# Patient Record
Sex: MALE | Race: WHITE | Employment: FULL TIME | URBAN - METROPOLITAN AREA
[De-identification: names, ages, dates, MRNs, and addresses within clinical notes are randomized per-mention and may not be internally consistent; named-entity substitution may affect disease eponyms.]

---

## 2022-06-29 ENCOUNTER — APPOINTMENT (OUTPATIENT)
Dept: GENERAL RADIOLOGY | Age: 47
End: 2022-06-29
Payer: COMMERCIAL

## 2022-06-29 ENCOUNTER — HOSPITAL ENCOUNTER (EMERGENCY)
Age: 47
Discharge: HOME | End: 2022-06-29
Attending: EMERGENCY MEDICINE
Payer: COMMERCIAL

## 2022-06-29 VITALS
WEIGHT: 200.2 LBS | RESPIRATION RATE: 16 BRPM | DIASTOLIC BLOOD PRESSURE: 84 MMHG | OXYGEN SATURATION: 97 % | HEART RATE: 94 BPM | TEMPERATURE: 98.8 F | BODY MASS INDEX: 32.31 KG/M2 | SYSTOLIC BLOOD PRESSURE: 142 MMHG

## 2022-06-29 DIAGNOSIS — S82.891A CLOSED FRACTURE OF RIGHT ANKLE, INITIAL ENCOUNTER: Primary | ICD-10-CM

## 2022-06-29 PROCEDURE — 73630 X-RAY EXAM OF FOOT: CPT

## 2022-06-29 PROCEDURE — 73610 X-RAY EXAM OF ANKLE: CPT

## 2022-06-29 PROCEDURE — 29515 APPLICATION SHORT LEG SPLINT: CPT

## 2022-06-29 PROCEDURE — 99283 EMERGENCY DEPT VISIT LOW MDM: CPT

## 2022-06-29 ASSESSMENT — PAIN DESCRIPTION - LOCATION
LOCATION: FOOT
LOCATION: FOOT

## 2022-06-29 ASSESSMENT — PAIN DESCRIPTION - ORIENTATION
ORIENTATION: RIGHT
ORIENTATION: RIGHT

## 2022-06-29 ASSESSMENT — PAIN DESCRIPTION - FREQUENCY: FREQUENCY: CONTINUOUS

## 2022-06-29 ASSESSMENT — PAIN SCALES - GENERAL
PAINLEVEL_OUTOF10: 3
PAINLEVEL_OUTOF10: 4

## 2022-06-29 ASSESSMENT — PAIN - FUNCTIONAL ASSESSMENT
PAIN_FUNCTIONAL_ASSESSMENT: 0-10
PAIN_FUNCTIONAL_ASSESSMENT: 0-10
PAIN_FUNCTIONAL_ASSESSMENT: PREVENTS OR INTERFERES SOME ACTIVE ACTIVITIES AND ADLS

## 2022-06-29 ASSESSMENT — PAIN DESCRIPTION - DESCRIPTORS: DESCRIPTORS: SORE

## 2022-06-29 NOTE — Clinical Note
Alea Hughes was seen and treated in our emergency department on 6/29/2022. He may return to work on 07/05/2022. Off work until seen by orthopedic physician and release. Elevate is much as possible to help with pain and swelling. Apply ice to the injured area every 2-3 hours for the next 2 days to help with pain and swelling. Tylenol or ibuprofen as needed for pain. If you have any questions or concerns, please don't hesitate to call.       Renzo Harris MD

## 2022-06-30 NOTE — ED TRIAGE NOTES
Pt to ED with complaint of right foot injury today. Pt states he was stepping off a trailer about 24 inches high today and rolled his foot. C/o pain outer distal foot. Ecchymosis, edema noted to outer distal foot. Right foot warm to touch with brisk cap refill, + dorsalis pedal pulse. Pt states he has been elevating and using ice to foot at home.

## 2022-06-30 NOTE — ED PROVIDER NOTES
[facility]  eMERGENCY dEPARTMENT eNCOUnter      Pt Name: Garry Miller  MRN: [id number]  [place] 1975  Date of evaluation: 6/29/2022  Provider: Jeremy Booth MD    [address] [place] [address]       Chief Complaint   Patient presents with    Foot Injury     States rolled right foot while getting off a trailer around 3pm.          HISTORY OF PRESENT ILLNESS  (Location/Symptom, Timing/Onset, Context/Setting, Quality, Duration, Modifying Factors, Severity.)   Garry Miller is a 41 y.o. male who presents to the emergency department complaining of right ankle and foot pain. Injury occurred around 3 PM today while at work. He was stepping off of his trailer. He was about 24 inches up. He stepped onto a rock. His foot and ankle rolled. He noticed some swelling over his lateral ankle and foot. He noticed some bruising. Pain is worse when he walks. No knee pain. Nursing Notes were reviewed and I agree. REVIEW OF SYSTEMS    (2-9 systems for level 4, 10 or more for level 5)     Musculoskeletal: Right ankle and foot pain. Swelling in his right ankle and foot. Skin: Bruising to his right ankle and foot. No lacerations or abrasions. Neuro: No extremity weakness numbness or tingling. Except as noted above the remainder of the review of systems was reviewed and negative. PAST MEDICAL HISTORY         Diagnosis Date    Hypertension     Kidney stone        SURGICAL HISTORY     History reviewed. No pertinent surgical history. CURRENT MEDICATIONS       Previous Medications    LISINOPRIL (PRINIVIL;ZESTRIL) 20 MG TABLET    Take 10 mg by mouth daily        ALLERGIES     Patient has no known allergies. FAMILY HISTORY     History reviewed. No pertinent family history. No family status information on file. SOCIAL HISTORY      reports that he has been smoking cigarettes. He has been smoking about 1.00 pack per day.  He has never used smokeless tobacco. He reports that he does not drink alcohol and does not use drugs. PHYSICAL EXAM    (up to 7 for level 4, 8 or more for level 5)     ED Triage Vitals [06/29/22 2112]   BP Temp Temp Source Heart Rate Resp SpO2 Height Weight   (!) 142/84 98.8 °F (37.1 °C) Oral 94 16 97 % -- 200 lb 3.2 oz (90.8 kg)       General: Alert white male no acute distress. Musculoskeletal: Right knee is nontender without swelling. Intact range of motion without pain. Proximal mid tib-fib are nontender. There is some tenderness over the distal fibula. There is some mild swelling over the anterior lateral ankle. The medial ankle is nontender. The posterior ankle and Achilles tendon are nontender. The Achilles tendon is intact. Range of motion is intact with mild to moderate discomfort. The swelling extends onto the dorsal lateral midfoot with tenderness in the dorsal lateral midfoot. The hindfoot and distal foot are nontender. Intact distal pulses. Skin: Bruising over the anterior lateral ankle extending onto the dorsal lateral midfoot. No lacerations or abrasions. Neuro: Intact motor function sensation right lower extremity. DIAGNOSTIC RESULTS     RADIOLOGY:   Non-plain film images such as CT, Ultrasound and MRI are read by the radiologist. Plain radiographic images are visualized and preliminarily interpreted by David Michel MD with the below findings:      Interpretation per the Radiologist below, if available at the time of this note:    XR FOOT RIGHT (MIN 3 VIEWS)   Final Result   Nondisplaced lateral and medial malleolar fractures         XR ANKLE RIGHT (MIN 3 VIEWS)   Final Result   Nondisplaced lateral and medial malleolar fractures             LABS:  Labs Reviewed - No data to display    All other labs were within normal range or not returned as of this dictation.     EMERGENCY DEPARTMENT COURSE and DIFFERENTIAL DIAGNOSIS/MDM:   Vitals:    Vitals:    06/29/22 2112   BP: (!) 142/84   Pulse: 94   Resp: 16   Temp: 98.8 °F (37.1 °C)   TempSrc: Oral   SpO2: 97%   Weight: 200 lb 3.2 oz (90.8 kg)       This patient injured his ankle as above. He has nondisplaced medial lateral malleolar fractures on x-rays of his ankle and foot. He was placed in an Ortho-Glass posterior splint. He was placed on crutches, nonweightbearing. He wants to take Tylenol and ibuprofen for pain. He was given orthopedic referral for follow-up for definitive treatment. X-ray results, diagnosis, treatment plan were discussed with the patient and his wife. They understand the treatment plan follow-up as discussed. PROCEDURES:  Splint Application    Date/Time: 6/29/2022 10:18 PM  Performed by: Wade Valencia MD  Authorized by: Wade Valencia MD     Consent:     Consent obtained:  Verbal    Consent given by:  Patient    Risks discussed:  Discoloration, numbness, pain and swelling  Pre-procedure details:     Sensation:  Normal    Skin color:  Bruising  Procedure details:     Laterality:  Right    Location:  Ankle    Ankle:  R ankle    Splint type:  Short leg    Supplies:  Elastic bandage, Ortho-Glass and cotton padding  Post-procedure details:     Pain:  Improved    Sensation:  Normal    Skin color:  Unchanged    Patient tolerance of procedure: Tolerated well, no immediate complications          FINAL IMPRESSION      1.  Closed fracture of right ankle, initial encounter          DISPOSITION/PLAN   DISPOSITION Decision To Discharge 06/29/2022 10:15:22 PM      PATIENT REFERRED TO:  Michelle Burciaga MD  [place] [address]  [phone]    Schedule an appointment as soon as possible for a visit in 3 days        DISCHARGE MEDICATIONS:  New Prescriptions    No medications on file       (Please note that portions of this note were completed with a voice recognition program.  Efforts were made to edit the dictations but occasionally words are mis-transcribed.)    David Michel MD  Attending Emergency Physician        Maria Del Carmen Dockery MD  06/29/22 [id number]

## 2022-06-30 NOTE — ED NOTES
Right foot warm to touch, orthoglass splint in place. Pt given discharge instructions, verbalizes understanding to elevate right leg as much as possible, leave orthoglass splint in place and keep dry, use crutches for ambulating.       Mery Powers RN  06/29/22 2926

## 2022-07-01 ENCOUNTER — OFFICE VISIT (OUTPATIENT)
Dept: ORTHOPEDIC SURGERY | Age: 47
End: 2022-07-01
Payer: COMMERCIAL

## 2022-07-01 VITALS — HEIGHT: 69 IN | WEIGHT: 200 LBS | BODY MASS INDEX: 29.62 KG/M2

## 2022-07-01 DIAGNOSIS — F17.200 CURRENT SMOKER: ICD-10-CM

## 2022-07-01 DIAGNOSIS — S82.841A CLOSED BIMALLEOLAR FRACTURE OF RIGHT ANKLE, INITIAL ENCOUNTER: Primary | ICD-10-CM

## 2022-07-01 PROCEDURE — 99406 BEHAV CHNG SMOKING 3-10 MIN: CPT | Performed by: ORTHOPAEDIC SURGERY

## 2022-07-01 PROCEDURE — 99203 OFFICE O/P NEW LOW 30 MIN: CPT | Performed by: ORTHOPAEDIC SURGERY

## 2022-07-01 PROCEDURE — L4361 PNEUMA/VAC WALK BOOT PRE OTS: HCPCS | Performed by: ORTHOPAEDIC SURGERY

## 2022-07-01 PROCEDURE — 27808 TREATMENT OF ANKLE FRACTURE: CPT | Performed by: ORTHOPAEDIC SURGERY

## 2022-07-04 PROBLEM — S82.841A CLOSED BIMALLEOLAR FRACTURE OF RIGHT ANKLE: Status: ACTIVE | Noted: 2022-07-04

## 2022-07-04 PROBLEM — F17.200 CURRENT SMOKER: Status: ACTIVE | Noted: 2022-07-04

## 2022-07-04 NOTE — PROGRESS NOTES
CHIEF COMPLAINT: Right ankle pain / medial & lateral malleolus avulsion fracture. 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 Danika Ortiz 41 y.o.  male presents today for the first visit for evaluation of a right ankle injury which occurred when he was stepping off a trailer at work and rolled his right ankle. He was first seen and evaluated in [facility] , where he was x-rayed, splinted and asked to f/u with Orthopedics. He is complaining of medial & lateral ankle pain and swelling. This is better with elevation and worse with bearing any wt. The pain is sharp and not radiating. No numbness or tingling sensation. Alleviating factors: rest. No other complaint. Past Medical History:   Diagnosis Date    Hypertension     Kidney stone        No past surgical history on file. Social History     Socioeconomic History    Marital status:      Spouse name: Not on file    Number of children: Not on file    Years of education: Not on file    Highest education level: Not on file   Occupational History    Not on file   Tobacco Use    Smoking status: Current Every Day Smoker     Packs/day: 1.00     Types: Cigarettes    Smokeless tobacco: Never Used   Substance and Sexual Activity    Alcohol use: No    Drug use: No    Sexual activity: Not on file   Other Topics Concern    Not on file   Social History Narrative    Not on file     Social Determinants of Health     Financial Resource Strain:     Difficulty of Paying Living Expenses: Not on file   Food Insecurity:     Worried About Running Out of Food in the Last Year: Not on file    [place] of Food in the Last Year: Not on file   Transportation Needs:     Lack of Transportation (Medical): Not on file    Lack of Transportation (Non-Medical):  Not on file   Physical Activity:     Days of Exercise per Week: Not on file    Minutes of Exercise per Session: Not on file   Stress:     Feeling of Stress : Not on file Social Connections:     Frequency of Communication with Friends and Family: Not on file    Frequency of Social Gatherings with Friends and Family: Not on file    Attends Jehovah's witness Services: Not on file    Active Member of Clubs or Organizations: Not on file    Attends Club or Organization Meetings: Not on file    Marital Status: Not on file   Intimate Partner Violence:     Fear of Current or Ex-Partner: Not on file    Emotionally Abused: Not on file    Physically Abused: Not on file    Sexually Abused: Not on file   Housing Stability:     Unable to Pay for Housing in the Last Year: Not on file    Number of [place] in the Last Year: Not on file    Unstable Housing in the Last Year: Not on file       No family history on file. Current Outpatient Medications on File Prior to Visit   Medication Sig Dispense Refill    lisinopril (PRINIVIL;ZESTRIL) 20 MG tablet Take 10 mg by mouth daily        No current facility-administered medications on file prior to visit. Pertinent items are noted in HPI  Review of systems reviewed from Patient History Form and available in the patient's chart under the Media tab. PHYSICAL EXAMINATION:  Mr. Mayra Zamorano is a very pleasant 41 y.o.  male who presents today in no acute distress, awake, alert, and oriented. He is well dressed, nourished and  groomed. Patient with normal affect. Height is  5' 9\" (1.753 m), weight is 200 lb (90.7 kg), Body mass index is 29.53 kg/m². Resting respiratory rate is 16. Examination of the gait, showed that the patient walks with a crutch, NWB right leg and in a splint . Examination of both ankles showing a decreased range of motion of the right ankle compare to the other side. There is moderate swelling that can be seen, as well as ecchymosis. He has intact sensation and good pedal pulses. He has significant tenderness on deep palpation over the medial & lateral malleolus of the right ankle. IMAGING: Xrays, 3 views of the right ankle dated 6/29/2022 from [facility],  were reviewed, and showed a minimally displaced medial & lateral malleolus avulsion fracture. IMPRESSION: Right ankle minimally displaced medial & lateral malleolus avulsion fracture. PLAN:  I discussed that the overall alignment of this fracture is good and that we can try to treat this non-operatively in a boot WBAT. We discussed the risk of nonunion and or malunion. We applied a boot today in the office and instructed him  in care. We will see him  back in 6 weeks at which time we will get a new xray of the right ankle. Light duty for 6 weeks. The patient smokes cigarettes, and we discussed with the patient the risks of smoking on general health and also on bone and soft tissue healing (delay and non-union), and promised to cut down or stop smoking. Smoking: Educated the patient regarding the hazards of smoking and that it harms their body in many ways. It increases the chance of developing heart disease, lung disease, cancer, and other health problems including poor bone and wound healing. The importance of smoking cessation for optimal bone and wound healing was stressed. This was communicated verbally, 5 Minutes. Procedures    CLOSED TX BIMALLEOLAR ANKLE FRACTURE W/O MANIP    Breg Tall Lakisha Walking Boot     Patient was prescribed a Breg Tall Lakisha Walking Boot. The right ankle will require stabilization / immobilization from this semi-rigid / rigid orthosis to improve their function. The orthosis will assist in protecting the affected area, provide functional support and facilitate healing. Patient was instructed to progress ambulation weight bearing as tolerated in the device. The patient was educated and fit by a healthcare professional with expert knowledge and specialization in brace application while under the direct supervision of the physician.   Verbal and written instructions for the use of and application of this item were provided. They were instructed to contact the office immediately should the brace result in increased pain, decreased sensation, increased swelling or worsening of the condition.      Georgiana Blandon MD

## 2022-08-12 ENCOUNTER — OFFICE VISIT (OUTPATIENT)
Dept: ORTHOPEDIC SURGERY | Age: 47
End: 2022-08-12
Payer: COMMERCIAL

## 2022-08-12 VITALS — HEIGHT: 69 IN | WEIGHT: 200 LBS | BODY MASS INDEX: 29.62 KG/M2

## 2022-08-12 DIAGNOSIS — F17.200 CURRENT SMOKER: ICD-10-CM

## 2022-08-12 DIAGNOSIS — S82.841D CLOSED BIMALLEOLAR FRACTURE OF RIGHT ANKLE WITH ROUTINE HEALING, SUBSEQUENT ENCOUNTER: Primary | ICD-10-CM

## 2022-08-12 PROCEDURE — 99024 POSTOP FOLLOW-UP VISIT: CPT | Performed by: ORTHOPAEDIC SURGERY

## 2022-08-12 PROCEDURE — 99406 BEHAV CHNG SMOKING 3-10 MIN: CPT | Performed by: ORTHOPAEDIC SURGERY

## 2022-08-12 NOTE — LETTER
[facility]  [address] [address]  Phone: [phone]  Fax: [phone]    Latha Barnhart MD        August 12, 2022     Patient: Celsa Barnes   YOB: 1975   Date of Visit: 8/12/2022       To Whom It May Concern: It is my medical opinion that Celsa Barnes may return to work on Monday 08/15/22 full duty. If you have any questions or concerns, please don't hesitate to call.     Sincerely,        Latha Barnhart MD

## 2022-08-21 NOTE — PROGRESS NOTES
CHIEF COMPLAINT: Right ankle pain / medial & lateral malleolus avulsion fracture. 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 Fermin Carmona 41 y.o.  male presents today for f/u evaluation of a right ankle injury which occurred when he was stepping off a trailer at work and rolled his right ankle. He was first seen and evaluated in [place] , where he was x-rayed, splinted and asked to f/u with Orthopedics. He is complaining of medial & lateral ankle pain and swelling 2/10. This is better with elevation and worse with bearing any wt. The pain is sharp and not radiating. No numbness or tingling sensation. Alleviating factors: rest. No other complaint. Past Medical History:   Diagnosis Date    Hypertension     Kidney stone        No past surgical history on file. Social History     Socioeconomic History    Marital status:      Spouse name: Not on file    Number of children: Not on file    Years of education: Not on file    Highest education level: Not on file   Occupational History    Not on file   Tobacco Use    Smoking status: Every Day     Packs/day: 1.00     Types: Cigarettes    Smokeless tobacco: Never   Substance and Sexual Activity    Alcohol use: No    Drug use: No    Sexual activity: Not on file   Other Topics Concern    Not on file   Social History Narrative    Not on file     Social Determinants of Health     Financial Resource Strain: Not on file   Food Insecurity: Not on file   Transportation Needs: Not on file   Physical Activity: Not on file   Stress: Not on file   Social Connections: Not on file   Intimate Partner Violence: Not on file   Housing Stability: Not on file       No family history on file. Current Outpatient Medications on File Prior to Visit   Medication Sig Dispense Refill    lisinopril (PRINIVIL;ZESTRIL) 20 MG tablet Take 10 mg by mouth daily        No current facility-administered medications on file prior to visit.        Pertinent items are noted in HPI  Review of systems reviewed from Patient History Form and available in the patient's chart under the Media tab. PHYSICAL EXAMINATION:  Mr. Anum Hunter is a very pleasant 41 y.o.  male who presents today in no acute distress, awake, alert, and oriented. He is well dressed, nourished and  groomed. Patient with normal affect. Height is  5' 9\" (1.753 m), weight is 200 lb (90.7 kg), Body mass index is 29.53 kg/m². Resting respiratory rate is 16. Examination of the gait, showed that the patient walks WB right leg and in a boot. Examination of both ankles showing a decreased range of motion of the right ankle compare to the other side. There is moderate swelling that can be seen, as well as ecchymosis. He has intact sensation and good pedal pulses. He has minimal tenderness on deep palpation over the medial & lateral malleolus of the right ankle. IMAGING: Xrays, 3 views of the right ankle taken today in the office, were reviewed, and showed a minimally displaced medial & lateral malleolus avulsion fracture. IMPRESSION: Right ankle minimally displaced medial & lateral malleolus avulsion fracture. PLAN:  I discussed that the overall alignment of this fracture is still good. He will be WBAT in the boot, and start aggressive ROM and peroneal strengthening exercise. Off the boot in 1-2 weeks. The patient will come back for a follow up in 2 months. At that time, we will take 3 views of the right ankle standing. Full duty on Monday 8/15/2022. The patient smokes cigarettes, and we discussed with the patient the risks of smoking on general health and also on bone and soft tissue healing (delay and non-union), and promised to cut down or stop smoking. Smoking: Educated the patient regarding the hazards of smoking and that it harms their body in many ways.  It increases the chance of developing heart disease, lung disease, cancer, and other health problems including poor bone and wound healing. The importance of smoking cessation for optimal bone and wound healing was stressed. This was communicated verbally, 5 Minutes.       Dinora Mcknight MD

## 2022-10-12 ENCOUNTER — OFFICE VISIT (OUTPATIENT)
Dept: ORTHOPEDIC SURGERY | Age: 47
End: 2022-10-12
Payer: COMMERCIAL

## 2022-10-12 VITALS — BODY MASS INDEX: 29.62 KG/M2 | HEIGHT: 69 IN | WEIGHT: 200 LBS

## 2022-10-12 DIAGNOSIS — S82.841D CLOSED BIMALLEOLAR FRACTURE OF RIGHT ANKLE WITH ROUTINE HEALING, SUBSEQUENT ENCOUNTER: Primary | ICD-10-CM

## 2022-10-12 DIAGNOSIS — F17.200 CURRENT SMOKER: ICD-10-CM

## 2022-10-12 PROCEDURE — 99406 BEHAV CHNG SMOKING 3-10 MIN: CPT | Performed by: ORTHOPAEDIC SURGERY

## 2022-10-12 PROCEDURE — 99213 OFFICE O/P EST LOW 20 MIN: CPT | Performed by: ORTHOPAEDIC SURGERY

## 2022-10-12 NOTE — PROGRESS NOTES
CHIEF COMPLAINT: Right ankle pain / medial & lateral malleolus avulsion fracture. 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 Maryann Moss 41 y.o.  male presents today for f/u evaluation of a right ankle injury which occurred when he was stepping off a trailer at work and rolled his right ankle. He was first seen and evaluated in [place] , where he was x-rayed, splinted and asked to f/u with Orthopedics. He reports mild achy ankle pain and swelling 4/10 worse in the morning and when the weather is changing to cold. Most of the time has no pain. Pain is not radiating. No numbness or tingling sensation. No other complaint. He works in [employer] and has been back to work full duty. He is a smoker. Past Medical History:   Diagnosis Date    Hypertension     Kidney stone        No past surgical history on file. Social History     Socioeconomic History    Marital status:      Spouse name: Not on file    Number of children: Not on file    Years of education: Not on file    Highest education level: Not on file   Occupational History    Not on file   Tobacco Use    Smoking status: Every Day     Packs/day: 1.00     Types: Cigarettes    Smokeless tobacco: Never   Substance and Sexual Activity    Alcohol use: No    Drug use: No    Sexual activity: Not on file   Other Topics Concern    Not on file   Social History Narrative    Not on file     Social Determinants of Health     Financial Resource Strain: Not on file   Food Insecurity: Not on file   Transportation Needs: Not on file   Physical Activity: Not on file   Stress: Not on file   Social Connections: Not on file   Intimate Partner Violence: Not on file   Housing Stability: Not on file       No family history on file.     Current Outpatient Medications on File Prior to Visit   Medication Sig Dispense Refill    lisinopril (PRINIVIL;ZESTRIL) 20 MG tablet Take 10 mg by mouth daily        No current facility-administered medications on file prior to visit. Pertinent items are noted in HPI  Review of systems reviewed from Patient History Form and available in the patient's chart under the Media tab. PHYSICAL EXAMINATION:  Mr. Isidoro Miller is a very pleasant 41 y.o.  male who presents today in no acute distress, awake, alert, and oriented. He is well dressed, nourished and  groomed. Patient with normal affect. Height is  5' 9\" (1.753 m), weight is 200 lb (90.7 kg), Body mass index is 29.53 kg/m². Resting respiratory rate is 16. Examination of the gait, showed that the patient walks WB right leg and in a boot. Examination of both ankles showing a decreased range of motion of the right ankle compare to the other side. He can dorsiflex to 0 degrees from neutral compared to the other side. There is mild swelling that can be seen, no ecchymosis. He has intact sensation and good pedal pulses. He has no tenderness on deep palpation over the medial & lateral malleolus of the right ankle. Ankle reflex 1+ bilaterally. Good strength, and no instability both upper and lower extremities. IMAGING: Xrays, 3 views of the right ankle taken today in the office, were reviewed, and showed a minimally displaced medial & lateral malleolus avulsion fracture. IMPRESSION: Right ankle minimally displaced medial & lateral malleolus avulsion fracture. PLAN: He can go back to normal activity with no restrictions. He was instructed to work on This. He will be seen PRN. I told the patient that it is not unusual to have some achy pain and swelling for up to a year after a fracture. He can return to work full duty with no restrictions. The patient smokes cigarettes, and we discussed with the patient the risks of smoking on general health and also on bone and soft tissue healing (delay and non-union), and promised to cut down or stop smoking.      Smoking: Educated the patient regarding the hazards of smoking and that it harms their body in many ways. It increases the chance of developing heart disease, lung disease, cancer, and other health problems including poor bone and wound healing. The importance of smoking cessation for optimal bone and wound healing was stressed. This was communicated verbally, 5 Minutes.       Henderson Krabbe, MD